# Patient Record
Sex: FEMALE | Race: BLACK OR AFRICAN AMERICAN
[De-identification: names, ages, dates, MRNs, and addresses within clinical notes are randomized per-mention and may not be internally consistent; named-entity substitution may affect disease eponyms.]

---

## 2017-04-07 ENCOUNTER — HOSPITAL ENCOUNTER (EMERGENCY)
Dept: HOSPITAL 62 - ER | Age: 27
Discharge: HOME | End: 2017-04-07
Payer: COMMERCIAL

## 2017-04-07 VITALS — DIASTOLIC BLOOD PRESSURE: 81 MMHG | SYSTOLIC BLOOD PRESSURE: 112 MMHG

## 2017-04-07 DIAGNOSIS — R51: ICD-10-CM

## 2017-04-07 DIAGNOSIS — R11.2: Primary | ICD-10-CM

## 2017-04-07 DIAGNOSIS — Z87.891: ICD-10-CM

## 2017-04-07 DIAGNOSIS — M79.1: ICD-10-CM

## 2017-04-07 DIAGNOSIS — J45.909: ICD-10-CM

## 2017-04-07 PROCEDURE — 87880 STREP A ASSAY W/OPTIC: CPT

## 2017-04-07 PROCEDURE — 87804 INFLUENZA ASSAY W/OPTIC: CPT

## 2017-04-07 PROCEDURE — 99283 EMERGENCY DEPT VISIT LOW MDM: CPT

## 2017-04-07 PROCEDURE — 87070 CULTURE OTHR SPECIMN AEROBIC: CPT

## 2017-04-07 NOTE — ER DOCUMENT REPORT
HPI





- HPI


Patient complains to provider of: flu symptoms with vomiting


Pain Level: 4


Context: 


Patient is a 27-year-old female presents emergency Department complaining of 

headache, body aches with nausea and vomiting that started this morning.  

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 

work or at home.  States she did not receive a flu vaccine this year.  She also 

admits to ejected fevers, chills.  Abdominal pain described as a burning achy 

pain in her epigastric area.  He denies any history of GERD or heartburn





Last menstrual period March 2016, was on Depo-Provera but stopped.  Sexually 

active with a female partner only.





Past medical history significant for asthma and seasonal allergies


Denies any surgeries


Social history admits to occasional alcohol use.








REVIEW OF SYSTEMS:


CONSTITUTIONAL :  admits to fever,  chills, or sweats.  Denies recent illness.


EENT:     Denies nasal or sinus congestion or discharge.  Denies throat, tongue

, or mouth swelling or difficulty swallowing.


CARDIOVASCULAR:  Denies chest pain.  Denies palpitations or racing or irregular 

heart beat.  Denies ankle edema.


RESPIRATORY:  Denies cough, cold, or chest congestion.  Denies shortness of 

breath, difficulty breathing, or wheezing.


GASTROINTESTINAL:  see history of present illness Denies abdominal pain or 

distention.  Denies blood in vomitus, stools, or per rectum.  Denies black, 

tarry stools.  Denies constipation. 


GENITOURINARY:  Denies difficulty urinating, painful urination, burning, 

frequency, blood in urine, or discharge.


FEMALE  GENITOURINARY:  Denies vaginal bleeding, heavy or abnormal periods, 

irregular periods.  Denies vaginal discharge or odor. 


MUSCULOSKELETAL: Denies any muscle spasms, difficulty walking, extremity pain


SKIN:   Denies rash, lesions or sores.


HEMATOLOGIC :   Denies easy bruising or bleeding.


LYMPHATIC:  Denies swollen, enlarged glands.


NEUROLOGICAL:  Denies confusion or altered mental status.  Denies passing out 

or loss of consciousness.  Denies dizziness or lightheadedness.  Denies 

headache.  Denies weakness or paralysis or loss of use of either side.  Denies 

problems with gait or speech.  Denies sensory loss, numbness, or tingling.  

Denies seizures.


PSYCHIATRIC:  Denies anxiety or stress.  Denies depression, suicidal ideation, 

or homicidal ideation.





ALL OTHER SYSTEMS REVIEWED AND NEGATIVE.








Dictation was performed using Dragon voice recognition software 





- DERM


Skin Color: Normal





Past Medical History





- Social History


Smoking Status: Former Smoker


Chew tobacco use (# tins/day): No


Frequency of alcohol use: Occasional


Drug Abuse: None


Family History: Reviewed & Not Pertinent


Patient has suicidal ideation: No


Patient has homicidal ideation: No


Pulmonary Medical History: Reports: Hx Asthma


Renal/ Medical History: Denies: Hx Peritoneal Dialysis


Surgical Hx: Negative





Vertical Provider Document





- CONSTITUTIONAL


Notes: 


PHYSICAL EXAM


GENERAL: Alert, interacts well. 


HEAD: Normocephalic, atraumatic.


EYES: Pupils equal, round, and reactive to light. Extraocular movements intact.


ENT: Peritonsillar erythema without any evidence of peritonsillar abscess or 

retropharyngeal abscess Oral mucosa moist, tongue midline. 


NECK: Full range of motion. Supple. Trachea midline.


LUNGS: Clear to auscultation bilaterally, no wheezes, rales, or rhonchi. No 

respiratory distress.


HEART: Regular rate and rhythm. No murmurs, gallops, or rubs.


ABDOMEN: Soft,  nondistended, nontender. No guarding, rebound, or rigidity.. 

Bowel sounds present in all 4 quadrants.


EXTREMITIES: Moves all 4 extremities spontaneously. No edema, radial and 

dorsalis pedis pulses 2/4 bilaterally. No cyanosis. 


NEUROLOGICAL: Alert and oriented x4. Normal speech.


PSYCH: Normal affect, normal mood.


SKIN: Warm, dry, normal turgor. No rashes or lesions noted.





- INFECTION CONTROL


TRAVEL OUTSIDE OF THE [place] IN LAST 30 DAYS: No





- RESPIRATORY


O2 Sat by Pulse Oximetry: 98





Course





- Re-evaluation


Re-evalutation: 





04/07/17 15:18


Patient is a 27-year-old female who presents with body aches and nausea 

vomiting that started today.  Patient responded well to GI cocktail.  This is 

negative for strep and influenza.  Hematologic is stable, no acute distress and 

afebrile.  Stable for discharge home.





- Vital Signs


Vital signs: 


 











Temp Pulse Resp BP Pulse Ox


 


 99.3 F   102 H  20   132/82 H  98 


 


 04/07/17 14:00  04/07/17 14:00  04/07/17 14:00  04/07/17 14:00  04/07/17 14:00














Discharge





- Discharge


Clinical Impression: 


 Nausea & vomiting





Condition: Good


Disposition: HOME, SELF-CARE


Instructions:  Acetaminophen, Antinausea Medication ([facility]), Viral Syndrome ([facility])

, Vomiting ([facility])


Prescriptions: 


Famotidine [Pepcid 20 mg Tablet] 20 mg PO BID #12 tablet


Ondansetron [Zofran Odt 4 mg Tablet] 1 - 2 tab PO Q4H PRN #15 tab.rapdis


 PRN Reason: For Nausea/Vomiting


Sucralfate [Carafate 1 gm Tablet] 1 gm PO ACHS #120 tablet


Forms:  Elevated Blood Pressure


Referrals: 


GEN LEVI MD [ACTIVE STAFF] - Follow up as needed

## 2017-04-09 ENCOUNTER — HOSPITAL ENCOUNTER (EMERGENCY)
Dept: HOSPITAL 62 - ER | Age: 27
Discharge: HOME | End: 2017-04-09
Payer: COMMERCIAL

## 2017-04-09 VITALS — DIASTOLIC BLOOD PRESSURE: 87 MMHG | SYSTOLIC BLOOD PRESSURE: 123 MMHG

## 2017-04-09 DIAGNOSIS — J45.909: ICD-10-CM

## 2017-04-09 DIAGNOSIS — R06.02: ICD-10-CM

## 2017-04-09 DIAGNOSIS — J34.89: ICD-10-CM

## 2017-04-09 DIAGNOSIS — R05: ICD-10-CM

## 2017-04-09 DIAGNOSIS — J02.9: ICD-10-CM

## 2017-04-09 DIAGNOSIS — J06.9: Primary | ICD-10-CM

## 2017-04-09 PROCEDURE — 99282 EMERGENCY DEPT VISIT SF MDM: CPT

## 2017-04-09 NOTE — ER DOCUMENT REPORT
HPI





- HPI


Patient complains to provider of: congestion


Pain Level: 5


Context: 


26 yo female c/o head congestion, sore throat, shortness of breath x 3 days.  P 

twas seen in ED 2 days ago, had negative rapid strep and rapid flu.  labs 

unremarkable.  pt reports n/v have improved but current symptoms started the 

day after her ED visis.  Denies fever, body aches, n/v.  Pt admits to hx/o 

asthma and her present symptoms are triggering her asthma.  Uses a rescue 

inhaler, which is about to "run out"


Associated Symptoms: Nonproductive cough, Sinus pain/drainage, Shortness of 

breath, Sore throat.  denies: Fever, Nausea, Vomiting


Exacerbated by: Denies


Relieved by: Denies


Similar symptoms previously: Yes


Recently seen / treated by doctor: Yes - ED 2 days ago





- ROS


Systems Reviewed and Negative: Yes All other systems reviewed and negative





- DERM


Skin Color: Normal





Past Medical History





- General


Information source: Patient





- Social History


Smoking Status: Never Smoker


Frequency of alcohol use: None


Drug Abuse: None


Lives with: Family


Family History: Reviewed & Not Pertinent


Patient has suicidal ideation: No


Patient has homicidal ideation: No


Pulmonary Medical History: Reports: Hx Asthma


Renal/ Medical History: Denies: Hx Peritoneal Dialysis





Vertical Provider Document





- CONSTITUTIONAL


Agree With Documented VS: Yes


Exam Limitations: No Limitations





- INFECTION CONTROL


TRAVEL OUTSIDE OF THE [place] IN LAST 30 DAYS: No





- HEENT


HEENT: Atraumatic, PERRLA, Pharyngeal Erythema.  negative: Pharyngeal Exudate





- NECK


Neck: Normal Inspection, Supple





- RESPIRATORY


Respiratory: Breath Sounds Normal, No Respiratory Distress


O2 Sat by Pulse Oximetry: 98





- CARDIOVASCULAR


Cardiovascular: Regular Rate, Regular Rhythm





- GI/ABDOMEN


Gastrointestinal: Abdomen Soft, Abdomen Non-Tender





- MUSCULOSKELETAL/EXTREMETIES


Musculoskeletal/Extremeties: MAEW





- NEURO


Level of Consciousness: Awake, Alert, Appropriate





- DERM


Integumentary: Warm, No Rash





Course





- Re-evaluation


Re-evalutation: 





04/09/17 09:19


H&P c/w URI.  recommend OTC antihistamine/decongestant.  Will refill albuterol 

inhaler for asthma.  Pt is afebrile, NAD.  pt agreeable with plan and stable 

for discharge





- Vital Signs


Vital signs: 


 











Temp Pulse Resp BP Pulse Ox


 


 98.3 F   83   16   123/87 H  98 


 


 04/09/17 08:52  04/09/17 08:52  04/09/17 08:52  04/09/17 08:52  04/09/17 08:52














Discharge





- Discharge


Clinical Impression: 


 URI (upper respiratory infection)





Condition: Stable


Disposition: HOME, SELF-CARE


Instructions:  Upper Respiratory Illness ([facility]), Asthma ([facility]), Inhaled 

Bronchodilators ([facility])


Additional Instructions: 


Your symptoms are consistant with a viral upper respiratory infection


no antibiotics are indicated


I recommend an over the counter antihistamine/decongestant such as Allegra D or 

Zyrtec D


Ibuprofin and push fluids


I will refill your inhaler for your asthma


Please follow up with your primary care if your symptoms persist


Prescriptions: 


Albuterol Sulfate [Proair HFA Inhalation Aerosol 8.5 gm MDI] 2 puff IH Q4H PRN #

1 mdi


 PRN Reason:

## 2017-05-12 ENCOUNTER — HOSPITAL ENCOUNTER (EMERGENCY)
Dept: HOSPITAL 62 - ER | Age: 27
Discharge: HOME | End: 2017-05-12
Payer: COMMERCIAL

## 2017-05-12 VITALS — DIASTOLIC BLOOD PRESSURE: 60 MMHG | SYSTOLIC BLOOD PRESSURE: 105 MMHG

## 2017-05-12 DIAGNOSIS — L29.9: ICD-10-CM

## 2017-05-12 DIAGNOSIS — R30.0: ICD-10-CM

## 2017-05-12 DIAGNOSIS — N89.8: ICD-10-CM

## 2017-05-12 DIAGNOSIS — N39.0: Primary | ICD-10-CM

## 2017-05-12 DIAGNOSIS — R35.0: ICD-10-CM

## 2017-05-12 DIAGNOSIS — J45.909: ICD-10-CM

## 2017-05-12 LAB
APPEARANCE UR: (no result)
BILIRUB UR QL STRIP: NEGATIVE
CHLAM PCR: NOT DETECTED
GLUCOSE UR STRIP-MCNC: NEGATIVE MG/DL
KETONES UR STRIP-MCNC: NEGATIVE MG/DL
NITRITE UR QL STRIP: POSITIVE
PH UR STRIP: 6 [PH] (ref 5–9)
PROT UR STRIP-MCNC: NEGATIVE MG/DL
SP GR UR STRIP: 1.02
UROBILINOGEN UR-MCNC: 2 MG/DL (ref ?–2)

## 2017-05-12 PROCEDURE — 87210 SMEAR WET MOUNT SALINE/INK: CPT

## 2017-05-12 PROCEDURE — 99283 EMERGENCY DEPT VISIT LOW MDM: CPT

## 2017-05-12 PROCEDURE — 81001 URINALYSIS AUTO W/SCOPE: CPT

## 2017-05-12 PROCEDURE — 87591 N.GONORRHOEAE DNA AMP PROB: CPT

## 2017-05-12 PROCEDURE — 87491 CHLMYD TRACH DNA AMP PROBE: CPT

## 2017-05-12 PROCEDURE — 81025 URINE PREGNANCY TEST: CPT

## 2017-05-12 NOTE — ER DOCUMENT REPORT
ED GI/





- General


Chief Complaint: Vaginal Itching


Stated Complaint: VAGINAL IRRITATION


Time Seen by Provider: 05/12/17 07:07


Mode of Arrival: Ambulatory


Information source: Patient


Notes: 


Patient presents complaining of vaginal redness, swelling and itching for the 

past month.  Patient reports using over-the-counter Monistat that improved her 

symptoms for a short time and then her symptoms returned.  Patient's concerned 

she may be yeast infection.  Patient also reports some dysuria and urinary 

frequency.  Patient denies any fever, abdominal pain, or back pain.


TRAVEL OUTSIDE OF THE [place] IN LAST 30 DAYS: No





- HPI


Patient complains to provider of: Other - Vaginal itching, redness.  No: Pelvic 

pain, Vaginal bleeding, Vaginal discharge


Onset: Other - One month


Timing/Duration: Waxing and waning


Quality of pain: Burning


Pain Level: 1


Vaginal bleeding (Compared to normal period): None


Sexual history: Active


Associated symptoms: Dysuria, Urinary frequency.  denies: Diarrhea, Fever, 

Nausea, Urinary hesitancy, Vomiting


Exacerbated by: Denies


Relieved by: Denies


Similar symptoms previously: Yes


Recently seen / treated by doctor: No





- Related Data


Allergies/Adverse Reactions: 


 





No Known Allergies Allergy (Verified 04/09/17 08:53)


 











Past Medical History





- General


Information source: Patient


Last Menstrual Period: March 2017





- Social History


Smoking Status: Never Smoker


Frequency of alcohol use: None


Drug Abuse: None


Family History: Reviewed & Not Pertinent


Patient has suicidal ideation: No


Patient has homicidal ideation: No


Pulmonary Medical History: Reports: Hx Asthma


Renal/ Medical History: Denies: Hx Peritoneal Dialysis


Surgical Hx: Negative





- Immunizations


Hx Diphtheria, Pertussis, Tetanus Vaccination: Yes





Review of Systems





- Review of Systems


Constitutional: No symptoms reported.  denies: Fever, Recent illness


EENT: No symptoms reported


Cardiovascular: No symptoms reported.  denies: Chest pain


Respiratory: No symptoms reported.  denies: Cough, Short of breath


Gastrointestinal: No symptoms reported.  denies: Abdominal pain, Diarrhea, 

Nausea, Vomiting


Genitourinary: Burning, Dysuria, Frequency


Female Genitourinary: Other - Vaginal redness, swelling.  denies: Vaginal 

discharge, Vaginal bleeding


Musculoskeletal: No symptoms reported.  denies: Back pain


Skin: No symptoms reported


Hematologic/Lymphatic: No symptoms reported


Neurological/Psychological: No symptoms reported





Physical Exam





- Vital signs


Vitals: 


 











Temp Pulse Resp BP Pulse Ox


 


 98.2 F   81   16   131/75 H  98 


 


 05/12/17 06:28  05/12/17 06:28  05/12/17 06:28  05/12/17 06:28  05/12/17 06:28














- General


General appearance: Appears well, Alert


In distress: None





- HEENT


Head: Normocephalic, Atraumatic


Eyes: Normal


Nasal: Normal


Mouth/Lips: Normal


Mucous membranes: Normal


Neck: Normal, Supple.  No: Lymphadenopathy





- Respiratory


Respiratory status: No respiratory distress


Chest status: Nontender


Breath sounds: Normal.  No: Rales, Rhonchi, Stridor, Wheezing


Chest palpation: Normal





- Cardiovascular


Rhythm: Regular


Heart sounds: S1 appreciated, S2 appreciated


Murmur: No





- Abdominal


Inspection: Normal


Distension: No distension


Bowel sounds: Normal


Tenderness: Nontender


Organomegaly: No organomegaly





- Genitourinary


External exam: Other - Mild erythema vaginal introitus.  No: Lesions


Speculum exam: Vaginal discharge - Small amount white clumpy discharge


Vaginal bleeding: None


Bimanuel exam: No: Cervical motion tender, Adnexal tenderness





- Back


Back: Normal, Nontender.  No: CVA tenderness





- Extremities


General upper extremity: Normal inspection, Normal strength


General lower extremity: Normal inspection, Normal strength





- Neurological


Neuro grossly intact: Yes


Cognition: Normal


Pérez Coma Scale Eye Opening: Spontaneous


Hazen Coma Scale Verbal: Oriented


Hazen Coma Scale Motor: Obeys Commands


Hazen Coma Scale Total: 15





- Psychological


Associated symptoms: Normal affect, Normal mood





- Skin


Skin Temperature: Warm


Skin Moisture: Dry


Skin Color: Normal





Course





- Vital Signs


Vital signs: 


 











Temp Pulse Resp BP Pulse Ox


 


 98.7 F   64   18   105/60   99 


 


 05/12/17 09:18  05/12/17 09:18  05/12/17 09:18  05/12/17 09:18  05/12/17 09:18














- Laboratory


Laboratory results interpreted by me: 


 











  05/12/17





  07:50


 


Urine Nitrite  POSITIVE H


 


Urine Urobilinogen  2.0 H


 


Ur Leukocyte Esterase  TRACE H











 Labs- Entire Visit











  05/12/17 05/12/17





  07:50 07:52


 


Urine Color  YELLOW 


 


Urine Appearance  SLIGHTLY-CLOUDY 


 


Urine pH  6.0 


 


Ur Specific Gravity  1.016 


 


Urine Protein  NEGATIVE 


 


Urine Glucose (UA)  NEGATIVE 


 


Urine Ketones  NEGATIVE 


 


Urine Blood  NEGATIVE 


 


Urine Nitrite  POSITIVE H 


 


Urine Bilirubin  NEGATIVE 


 


Urine Urobilinogen  2.0 H 


 


Ur Leukocyte Esterase  TRACE H 


 


Urine WBC (Auto)  4 


 


Urine RBC (Auto)  0 


 


Urine Bacteria (Auto)  TRACE 


 


Squamous Epi Cells Auto  1 


 


Urine Mucus (Auto)  FEW 


 


Urine Ascorbic Acid  NEGATIVE 


 


Urine HCG, Qual  NEGATIVE 


 


Trichomonas (Wet Prep)   NO TRICHOMONAS SEEN


 


Vaginal WBC   NO WBCS SEEN


 


Vaginal Yeast   NO YEAST SEEN














05/12/17 09:44








Discharge





- Discharge


Clinical Impression: 


UTI (urinary tract infection)


Qualifiers:


 Urinary tract infection type: site unspecified Hematuria presence: without 

hematuria Qualified Code(s): N39.0 - Urinary tract infection, site not specified





Condition: Stable


Disposition: HOME, SELF-CARE


Instructions:  Urinary Tract Infection ([facility]), Trimethoprim-Sulfa ([facility]), Urinary 

Anesthetic Agent ([facility])


Additional Instructions: 


Return immediately for any new or worsening symptoms





Followup with your primary care provider, call tomorrow to make a followup 

appointment








Prescriptions: 


Phenazopyridine HCl [Pyridium 200 mg Tablet] 200 mg PO TID #15 tablet


Sulfamethoxazole/Trimethoprim [Bactrim Ds Tablet] 1 each PO BID #10 tablet


Forms:  Return to Work


Referrals: 


ENEDINA MAHONEY [Primary Care Provider] - Follow up as needed


[facility] [Provider Group] - Follow up as needed


[facility] [Provider Group] - Follow up as needed

## 2019-08-06 ENCOUNTER — HOSPITAL ENCOUNTER (EMERGENCY)
Dept: HOSPITAL 62 - ER | Age: 29
Discharge: HOME | End: 2019-08-06
Payer: COMMERCIAL

## 2019-08-06 VITALS — DIASTOLIC BLOOD PRESSURE: 76 MMHG | SYSTOLIC BLOOD PRESSURE: 127 MMHG

## 2019-08-06 DIAGNOSIS — F17.200: ICD-10-CM

## 2019-08-06 DIAGNOSIS — M25.571: ICD-10-CM

## 2019-08-06 DIAGNOSIS — G89.29: Primary | ICD-10-CM

## 2019-08-06 DIAGNOSIS — J45.909: ICD-10-CM

## 2019-08-06 PROCEDURE — L4350 ANKLE CONTROL ORTHO PRE OTS: HCPCS

## 2019-08-06 PROCEDURE — 99283 EMERGENCY DEPT VISIT LOW MDM: CPT

## 2019-08-06 PROCEDURE — 96374 THER/PROPH/DIAG INJ IV PUSH: CPT

## 2019-08-06 NOTE — ER DOCUMENT REPORT
HPI





- HPI


Time Seen by Provider: 08/06/19 09:34


Pain Level: 4


Notes: 





Patient is a 29-year-old female with a history of asthma who presents 

complaining of right medial ankle pain intermittently for months.  Patient 

states that she has been seen by 2 podiatrists and has received insoles as well 

as an injection which do seem to help, but her symptoms return.  Patient states 

that she is on her feet 10 hours a day which is what precipitates her soreness. 

Patient states that the pain does not radiate.  No injury.  Denies drug 

allergies.  Denies any headache, fever, URI, sore throat, chest pain, 

palpitations, syncope, cough, shortness of breath, wheeze, dyspnea, abdominal 

pain, nausea/vomiting/diarrhea, urinary retention, dysuria, hematuria, back 

pain, loss of control of bowel or bladder, numbness/tingling, muscle 

paralysis/weakness, or rash.





- ROS


Systems Reviewed and Negative: Yes All other systems reviewed and negative





- REPRODUCTIVE


Reproductive: DENIES: Pregnant:





- MUSCULOSKELETAL


Musculoskeletal: REPORTS: Extremity pain - right ankle





Past Medical History





- Social History


Smoking Status: Current Every Day Smoker


Frequency of alcohol use: None


Drug Abuse: None


Family History: Reviewed & Not Pertinent


Patient has suicidal ideation: No


Patient has homicidal ideation: No


Pulmonary Medical History: Reports: Hx Asthma


Renal/ Medical History: Denies: Hx Peritoneal Dialysis





- Immunizations


Hx Diphtheria, Pertussis, Tetanus Vaccination: Yes





Vertical Provider Document





- CONSTITUTIONAL


Agree With Documented VS: Yes


Notes: 





PHYSICAL EXAMINATION:





GENERAL: Well-appearing, well-nourished and in no acute distress.





LUNGS: Breath sounds clear to auscultation bilaterally and equal.  No wheezes 

rales or rhonchi.





HEART: Regular rate and rhythm without murmurs, rubs, gallops.





Musculoskeletal: Rt foot/ankle:  No ecchymosis, swelling, erythema, warmth, or 

deformity.  FROM to passive/active. Strength 5+/5. N/V intact distal.  No bony 

tenderness of the foot/ankle.  Achilles intact.  Lis Franc maneuver neg.  

Anterior drawer neg.





Extremities:  No cyanosis, clubbing, or edema b/l.  Peripheral pulses 2+.  

Capillary refill less than 3 seconds.





NEUROLOGICAL: Normal speech, normal gait.  Normal sensory, motor exams 





PSYCH: Normal mood, normal affect.





SKIN: Warm, Dry, normal turgor, no rashes or lesions noted.





- INFECTION CONTROL


TRAVEL OUTSIDE OF THE [place] IN LAST 30 DAYS: No





Course





- Re-evaluation


Re-evalutation: 





08/06/19 09:43


Patient is an afebrile, well-hydrated, 29-year-old female who presents to the ED

with chronic right ankle pain.  Vitals are acceptable without any significant 

tachycardia, tachypnea, or hypoxia.  PE is otherwise unremarkable for any 

neurovascular compromise, obvious tendon/ligament rupture, obvious 

fracture/dislocation, septic joint.  Ankle stirrup and toradol given today.  

Patient is nontoxic-appearing.  Patient is able to ambulate and weight-bear.  No

other labs or imaging warranted at this time based on H&P.  Conservative 

measures otherwise for symptoms.  Recheck with your PCM in 3-5 days.  Consider 

consult podiatry.  Return to the ED with any worsening/concerning symptoms 

otherwise as reviewed in discharge.  Patient is in agreement.





- Vital Signs


Vital signs: 


                                        











Temp Pulse Resp BP Pulse Ox


 


 98.2 F   97   16   127/76 H  96 


 


 08/06/19 09:09  08/06/19 09:09  08/06/19 09:09  08/06/19 09:09  08/06/19 09:09














Discharge





- Discharge


Clinical Impression: 


Right ankle pain


Qualifiers:


 Chronicity: chronic Qualified Code(s): M25.571 - Pain in right ankle and joints

of right foot; G89.29 - Other chronic pain





Condition: Stable


Disposition: HOME, SELF-CARE


Additional Instructions: 


Rest, Ice, Compression, Elevation


Tylenol/ibuprofen as needed


Light stretches daily


Strength exercises as able


Moist heat and massage may help


F/u with your PCP in 3-5 days for a recheck


Consider consult(s) with Podiatry/Orthopedics/physical therapy for 

ongoing/worsening symptoms





Return to the ED with any worsening symptoms and/or development of fever, 

headache, chest pain, palpitations, syncope, shortness of breath, trouble 

breathing, abdominal pain, n/v/d, muscle weakness/paralysis, numbness/tingling, 

swelling, redness, or other worsening symptoms that are concerning to you.


Forms:  Elevated Blood Pressure, Smoking Cessation Education, Return to Work


Referrals: 


MONICA MCMANUS DPM [ACTIVE STAFF] - Follow up as needed

## 2019-11-04 ENCOUNTER — HOSPITAL ENCOUNTER (EMERGENCY)
Dept: HOSPITAL 62 - ER | Age: 29
Discharge: HOME | End: 2019-11-04
Payer: SELF-PAY

## 2019-11-04 VITALS — DIASTOLIC BLOOD PRESSURE: 65 MMHG | SYSTOLIC BLOOD PRESSURE: 105 MMHG

## 2019-11-04 DIAGNOSIS — R11.10: ICD-10-CM

## 2019-11-04 DIAGNOSIS — R50.9: ICD-10-CM

## 2019-11-04 DIAGNOSIS — F17.200: ICD-10-CM

## 2019-11-04 DIAGNOSIS — R19.7: Primary | ICD-10-CM

## 2019-11-04 DIAGNOSIS — R10.30: ICD-10-CM

## 2019-11-04 LAB
ADD MANUAL DIFF: NO
ADD MANUAL MICROSCOPIC: YES
ALBUMIN SERPL-MCNC: 3.4 G/DL (ref 3.5–5)
ALP SERPL-CCNC: 40 U/L (ref 38–126)
ANION GAP SERPL CALC-SCNC: 5 MMOL/L (ref 5–19)
APPEARANCE UR: (no result)
APTT PPP: YELLOW S
AST SERPL-CCNC: 17 U/L (ref 14–36)
BACTERIA #/AREA URNS HPF: (no result) /HPF
BASOPHILS # BLD AUTO: 0 10^3/UL (ref 0–0.2)
BASOPHILS NFR BLD AUTO: 0.4 % (ref 0–2)
BILIRUB DIRECT SERPL-MCNC: 0 MG/DL (ref 0–0.4)
BILIRUB SERPL-MCNC: 0.6 MG/DL (ref 0.2–1.3)
BILIRUB UR QL STRIP: NEGATIVE
BUN SERPL-MCNC: 8 MG/DL (ref 7–20)
CALCIUM: 8.7 MG/DL (ref 8.4–10.2)
CHLORIDE SERPL-SCNC: 109 MMOL/L (ref 98–107)
CO2 SERPL-SCNC: 24 MMOL/L (ref 22–30)
EOSINOPHIL # BLD AUTO: 0.1 10^3/UL (ref 0–0.6)
EOSINOPHIL NFR BLD AUTO: 1.4 % (ref 0–6)
ERYTHROCYTE [DISTWIDTH] IN BLOOD BY AUTOMATED COUNT: 11.9 % (ref 11.5–14)
GLUCOSE SERPL-MCNC: 92 MG/DL (ref 75–110)
GLUCOSE UR STRIP-MCNC: NEGATIVE MG/DL
HCT VFR BLD CALC: 42.2 % (ref 36–47)
HGB BLD-MCNC: 14.8 G/DL (ref 12–15.5)
KETONES UR STRIP-MCNC: NEGATIVE MG/DL
LYMPHOCYTES # BLD AUTO: 1.7 10^3/UL (ref 0.5–4.7)
LYMPHOCYTES NFR BLD AUTO: 34.7 % (ref 13–45)
MCH RBC QN AUTO: 33.2 PG (ref 27–33.4)
MCHC RBC AUTO-ENTMCNC: 35.1 G/DL (ref 32–36)
MCV RBC AUTO: 95 FL (ref 80–97)
MONOCYTES # BLD AUTO: 0.5 10^3/UL (ref 0.1–1.4)
MONOCYTES NFR BLD AUTO: 9.4 % (ref 3–13)
NEUTROPHILS # BLD AUTO: 2.7 10^3/UL (ref 1.7–8.2)
NEUTS SEG NFR BLD AUTO: 54.1 % (ref 42–78)
NITRITE UR QL STRIP: NEGATIVE
PH UR STRIP: 7 [PH] (ref 5–9)
PLATELET # BLD: 225 10^3/UL (ref 150–450)
POTASSIUM SERPL-SCNC: 4.4 MMOL/L (ref 3.6–5)
PROT SERPL-MCNC: 5.9 G/DL (ref 6.3–8.2)
PROT UR STRIP-MCNC: NEGATIVE MG/DL
RBC # BLD AUTO: 4.47 10^6/UL (ref 3.72–5.28)
RBC #/AREA URNS HPF: (no result) /HPF
SP GR UR STRIP: 1.02
TOTAL CELLS COUNTED % (AUTO): 100 %
UROBILINOGEN UR-MCNC: 2 MG/DL (ref ?–2)
WBC # BLD AUTO: 5 10^3/UL (ref 4–10.5)
WBC #/AREA URNS HPF: (no result) /HPF

## 2019-11-04 PROCEDURE — 36415 COLL VENOUS BLD VENIPUNCTURE: CPT

## 2019-11-04 PROCEDURE — 81001 URINALYSIS AUTO W/SCOPE: CPT

## 2019-11-04 PROCEDURE — 99284 EMERGENCY DEPT VISIT MOD MDM: CPT

## 2019-11-04 PROCEDURE — 80053 COMPREHEN METABOLIC PANEL: CPT

## 2019-11-04 PROCEDURE — S0119 ONDANSETRON 4 MG: HCPCS

## 2019-11-04 PROCEDURE — 83690 ASSAY OF LIPASE: CPT

## 2019-11-04 PROCEDURE — 85025 COMPLETE CBC W/AUTO DIFF WBC: CPT

## 2019-11-04 PROCEDURE — 81025 URINE PREGNANCY TEST: CPT

## 2019-11-04 NOTE — ER DOCUMENT REPORT
ED Medical Screen (RME)





- General


Chief Complaint: Abdominal Pain


Stated Complaint: ABDOMINAL PAIN


Time Seen by Provider: 11/04/19 07:58


Mode of Arrival: Ambulatory


Information source: Patient


Notes: 





Patient presents complaining of nausea vomiting diarrhea for the past several 

days with lower abdominal tenderness.  Patient denies any concerns about 

pregnancy.  Patient denies any urinary symptoms at this time.





I have greeted and performed a rapid initial assessment of this patient.  A 

comprehensive ED assessment and evaluation of the patient, analysis of test 

results and completion of the medical decision making process will be conducted 

by additional ED providers.


TRAVEL OUTSIDE OF THE [place] IN LAST 30 DAYS: No





- Related Data


Allergies/Adverse Reactions: 


                                        





No Known Allergies Allergy (Verified 11/04/19 07:32)


   








Home Medications: albuterol inhaler prn





Past Medical History





- Social History


Chew tobacco use (# tins/day): No


Frequency of alcohol use: None


Drug Abuse: None


Pulmonary Medical History: Reports: Hx Asthma


Renal/ Medical History: Denies: Hx Peritoneal Dialysis





- Immunizations


Hx Diphtheria, Pertussis, Tetanus Vaccination: Yes





Physical Exam





- Abdominal


Tenderness: Tender - Lower pelvic

## 2019-11-04 NOTE — ER DOCUMENT REPORT
ED General





- General


Chief Complaint: Abdominal Pain


Stated Complaint: ABDOMINAL PAIN


Time Seen by Provider: 11/04/19 07:58


Mode of Arrival: Ambulatory


Information source: Patient


TRAVEL OUTSIDE OF THE [place] IN LAST 30 DAYS: No





- HPI


Notes: 





29 healthy female presents today for ~2 days crampy intermittent abd pain 

worsened by loose stools throghout day, ++nausea w/o vomiting. reports comes 

today since started 2 days ago and just not getting better as of today. denies 

urinary symptoms. denies flank or inguinal or pelvic pain, gabbi VD, dysuria or

other urineary sx.  no known sick contacts. has felt slightly warm on and off 

hasnt' taken temp at home.  no travel. no other recent illness/antibiotics. 

denies h/o abd surgeries or GI hx.  has cont to have po intact but feels she 

fairly quickly has to have a loose BM which will make her cramp but then feels 

better once has defecated. denies melena/hematochezia/dyschezia. no (near) 

passing out. no rashes 





- Related Data


Allergies/Adverse Reactions: 


                                        





No Known Allergies Allergy (Verified 11/04/19 07:32)


   








Home Medications: albuterol inhaler prn





Past Medical History





- General


Information source: Patient





- Social History


Smoking Status: Current Every Day Smoker


Chew tobacco use (# tins/day): No


Frequency of alcohol use: None


Drug Abuse: None


Family History: Reviewed & Not Pertinent


Patient has suicidal ideation: No


Patient has homicidal ideation: No


Pulmonary Medical History: Reports: Hx Asthma


Renal/ Medical History: Denies: Hx Peritoneal Dialysis





- Immunizations


Hx Diphtheria, Pertussis, Tetanus Vaccination: Yes





Review of Systems





- Review of Systems


Constitutional: See HPI


EENT: No symptoms reported


Cardiovascular: No symptoms reported


Respiratory: No symptoms reported


Gastrointestinal: See HPI


Genitourinary: No symptoms reported


Female Genitourinary: No symptoms reported


Musculoskeletal: No symptoms reported


Skin: No symptoms reported


Hematologic/Lymphatic: No symptoms reported


Neurological/Psychological: No symptoms reported





Physical Exam





- Vital signs


Vitals: 


                                        











Temp Pulse Resp BP Pulse Ox


 


 98.0 F   88   14   120/74   100 


 


 11/04/19 07:33  11/04/19 07:33  11/04/19 07:33  11/04/19 07:33  11/04/19 07:33











Interpretation: Normal





- General


General appearance: Appears well, Alert


In distress: None





- HEENT


Head: Normocephalic, Atraumatic


Eyes: Normal


Pupils: PERRL





- Respiratory


Respiratory status: No respiratory distress


Chest status: Nontender


Breath sounds: Normal


Chest palpation: Normal





- Cardiovascular


Rhythm: Regular


Heart sounds: Normal auscultation


Murmur: No





- Abdominal


Inspection: Normal.  No: Wounds


Distension: No distension


Bowel sounds: Normal


Tenderness: Tender - there is nonfocal ttp she vergbalizes when i palpate more 

deeply in b/l mid/lower quadrants.  No: McBurney's point, Frost's sign, 

Guarding, Rebound


Organomegaly: No organomegaly





- Rectal


Notes: 





deferred had no episodes diarrhea while observed in ed





- Back


Back: Normal, Nontender





- Extremities


General upper extremity: Normal inspection, Nontender, Normal color, Normal ROM,

Normal temperature


General lower extremity: Normal inspection, Nontender, Normal color, Normal ROM,

Normal temperature, Normal weight bearing.  No: Juan's sign





- Neurological


Neuro grossly intact: Yes


Cognition: Normal


Orientation: AAOx4


Pérez Coma Scale Eye Opening: Spontaneous


[place] Coma Scale Verbal: Oriented


Pérez Coma Scale Motor: Obeys Commands


[place] Coma Scale Total: 15


Speech: Normal


Motor strength normal: LUE, RUE, LLE, RLE


Sensory: Normal





- Psychological


Associated symptoms: Normal affect, Normal mood





- Skin


Skin Temperature: Warm


Skin Moisture: Dry


Skin Color: Normal





Course





- Re-evaluation


Re-evalutation: 





12/04/19 18:12


pt labs reviewed wnl. upt (-) urine wnl. vomited nonbloody 1x inED after odt 

zofran therefore admin 4iv.  and then asked if she felt she could try some 

liquids. her pain wasn't a problem while she rested in ED and her VS remained 

wnl (at prior baselines); therefore didn't admin iv fluids. she tolerated po ok.







i spoke w/ pt regarding my exam which was reassuring to me since her ttp was 

mild and w/ her hx of low grade fevers and loose stools most likely this is a 

viral Ge. we discussed warning signs for return and ways to ensure she stays 

hydrated staying away from high sugar content liquids to prevent more diarrhea. 

pt understands. i also said watch out for bloody or cont diarrhea or if abd pain

were to become more localized eg. in center of belly or ruq or one specific spot

or she has increased sx w/ fever vomiting and this type of pain she should be 

seen and we'd need to consider her appendix and other things besides VGE. 


12/04/19 18:16








- Vital Signs


Vital signs: 


                                        











Temp Pulse Resp BP Pulse Ox


 


 99.1 F   83   18   105/65   96 


 


 11/04/19 11:07  11/04/19 11:07  11/04/19 11:07  11/04/19 11:07  11/04/19 11:07














- Laboratory


Result Diagrams: 


                                 11/04/19 08:36





                                 11/04/19 08:36


Laboratory results interpreted by me: 


                                        











  11/04/19 11/04/19





  08:00 08:36


 


Chloride   109 H


 


Total Protein   5.9 L


 


Albumin   3.4 L


 


Urine Urobilinogen  2.0 H 














Discharge





- Discharge


Clinical Impression: 


 Diarrhea, Abdominal cramping





Condition: Good


Disposition: HOME, SELF-CARE


Additional Instructions: 


Please follow-up with your regular doctor for regular maintenance.  Continue to 

stay hydrated and eat normal meals.  Watch for any fevers greater than 100.4, 

any vomiting or any blood in stool.  Please return if you do have any pain or 

vaginal discharge or symptoms, today you have not had any continued loose stools

and you are very well-appearing well-hydrated, so hopefully this is self 

resolved.  Please though be looking out for any worsening described above SO YOU

can seek care.


Forms:  Return to Work

## 2019-12-17 ENCOUNTER — HOSPITAL ENCOUNTER (EMERGENCY)
Dept: HOSPITAL 62 - ER | Age: 29
Discharge: LEFT BEFORE DISCHARGE | End: 2019-12-17
Payer: SELF-PAY

## 2019-12-17 VITALS — DIASTOLIC BLOOD PRESSURE: 79 MMHG | SYSTOLIC BLOOD PRESSURE: 105 MMHG

## 2019-12-17 DIAGNOSIS — Z53.21: Primary | ICD-10-CM

## 2020-04-14 ENCOUNTER — HOSPITAL ENCOUNTER (EMERGENCY)
Dept: HOSPITAL 62 - ER | Age: 30
Discharge: HOME | End: 2020-04-14
Payer: SELF-PAY

## 2020-04-14 VITALS — SYSTOLIC BLOOD PRESSURE: 123 MMHG | DIASTOLIC BLOOD PRESSURE: 88 MMHG

## 2020-04-14 DIAGNOSIS — J45.41: Primary | ICD-10-CM

## 2020-04-14 PROCEDURE — 99283 EMERGENCY DEPT VISIT LOW MDM: CPT

## 2020-04-14 PROCEDURE — 94640 AIRWAY INHALATION TREATMENT: CPT

## 2020-04-14 NOTE — ER DOCUMENT REPORT
HPI





<THADDEUS WALSH - Last Filed: 04/14/20 11:09>





- HPI


Notes: 





30-year-old female patient presented to the emergency department chief complaint

of possible asthma exacerbation.  Patient reports that she is out of her pro-air

inhaler.  She reports she has had increased wheezing over the last 4 days.  She 

states her allergies trigger her asthma.








- REPRODUCTIVE


Reproductive: DENIES: Pregnant:





<JUNIE LE - Last Filed: 04/16/20 01:40>





- HPI


Time Seen by Provider: 04/14/20 04:50





Past Medical History





- General


Information source: Patient





- Social History


Smoking Status: Never Smoker


Family History: Reviewed & Not Pertinent


Pulmonary Medical History: Reports: Hx Asthma


Renal/ Medical History: Denies: Hx Peritoneal Dialysis


Surgical Hx: Negative





- Immunizations


Hx Diphtheria, Pertussis, Tetanus Vaccination: Yes





<JUNIE LE - Last Filed: 04/16/20 01:40>





[facility] Provider Document





- CONSTITUTIONAL


Notes: 





PHYSICAL EXAMINATION:





GENERAL: Well-appearing, well-nourished and in no acute distress.





HEAD: Atraumatic, normocephalic.





EYES: Pupils equal round extraocular movements intact,  conjunctiva are normal.





ENT: Nares patent





NECK: Normal range of motion





LUNGS: No respiratory distress, mild to moderate expiratory wheezes noted 

bilaterally.  No increased work of breathing.





Musculoskeletal: Normal range of motion





NEUROLOGICAL:  Normal speech, normal gait. 





PSYCH: Normal mood, normal affect.





SKIN: Warm, Dry, normal turgor, no rashes or lesions noted.





- INFECTION CONTROL


TRAVEL OUTSIDE OF THE [place] IN LAST 30 DAYS: No





<JUNIE LE - Last Filed: 04/16/20 01:40>





Course





- Re-evaluation


Re-evalutation: 





04/14/20 10:47


[facility] pharmacy called and asked to have the nebulizer prescription sent to 

reload pharmacy.  This was done electronically.





- Vital Signs


Vital signs: 


                                        











Temp Pulse Resp BP Pulse Ox


 


 98.3 F   96   16   123/88 H  100 


 


 04/14/20 05:02  04/14/20 06:35  04/14/20 06:35  04/14/20 05:02  04/14/20 06:35














<THADDEUS WALSH - Last Filed: 04/14/20 11:09>





- Re-evaluation


Re-evalutation: 








Patient had mild to moderate expiratory wheezes noted on arrival.  She was not 

in any respiratory distress.  She was given breathing treatments and prednisone 

here in the emergency department and had significant relief.  Patient will be 

discharged home at this time.  Multiple prescriptions sent for patient.








<JUNIE LE ANALY - Last Filed: 04/16/20 01:40>





Discharge





<THADDEUS WALSH - Last Filed: 04/14/20 11:09>





<JUNIE LE - Last Filed: 04/16/20 01:40>





- Discharge


Clinical Impression: 


Asthma exacerbation


Qualifiers:


 Asthma severity: moderate Asthma persistence: persistent Qualified Code(s): 

J45.41 - Moderate persistent asthma with (acute) exacerbation





Condition: Stable


Disposition: HOME, SELF-CARE


Instructions:  Asthma ([facility])


Additional Instructions: 


Please take medications as prescribed.


Try to establish care with a primary care provider.


There is information on the second page of this packet for the [facility].


I write you a prescription for an inhaled corticosteroid.  If this is not 

affordable I also reviewed a prescription for a nebulizer with medicine for it 

that contain steroids.  Please use as prescribed.  Use the pro-air inhaler for 

shortness of breath or emergencies.  You may take 2 puffs every 4 hours as 

needed.





Prescriptions: 


Prednisone [Deltasone 20 mg Tablet] 3 tab PO DAILY 4 Days #12 tablet


Ipratropium/Albuterol Sulfate [Duoneb 3 ml Ampul] 3 ml NEB [id number] PRN #30 

vial.neb


 PRN Reason: 


Nebulizer [Intelligent Mesh Nebulizer] 1 each MC Q6 PRN #1 each


 PRN Reason: 


Nebulizer [Intelligent Mesh Nebulizer] 1 each MC Q6H PRN #1 each


 PRN Reason: 


Nebulizer [Intelligent Mesh Nebulizer] 1 each MC Q6H PRN #1 each


 PRN Reason: 


Beclomethasone Dipropionate [Qvar Redihaler] 1 - 4 puff IH BID #1 hfa.aeroba

## 2020-06-04 ENCOUNTER — HOSPITAL ENCOUNTER (EMERGENCY)
Dept: HOSPITAL 62 - ER | Age: 30
Discharge: HOME | End: 2020-06-04
Payer: SELF-PAY

## 2020-06-04 VITALS — DIASTOLIC BLOOD PRESSURE: 89 MMHG | SYSTOLIC BLOOD PRESSURE: 121 MMHG

## 2020-06-04 DIAGNOSIS — F17.200: ICD-10-CM

## 2020-06-04 DIAGNOSIS — R11.2: Primary | ICD-10-CM

## 2020-06-04 DIAGNOSIS — J45.909: ICD-10-CM

## 2020-06-04 DIAGNOSIS — Z20.828: ICD-10-CM

## 2020-06-04 DIAGNOSIS — R19.7: ICD-10-CM

## 2020-06-04 DIAGNOSIS — R06.02: ICD-10-CM

## 2020-06-04 LAB
A TYPE INFLUENZA AG: NEGATIVE
ADD MANUAL DIFF: NO
ALBUMIN SERPL-MCNC: 5 G/DL (ref 3.5–5)
ALP SERPL-CCNC: 79 U/L (ref 38–126)
ANION GAP SERPL CALC-SCNC: 7 MMOL/L (ref 5–19)
APPEARANCE UR: (no result)
APTT PPP: YELLOW S
AST SERPL-CCNC: 20 U/L (ref 14–36)
B INFLUENZA AG: NEGATIVE
BASOPHILS # BLD AUTO: 0 10^3/UL (ref 0–0.2)
BASOPHILS NFR BLD AUTO: 0.6 % (ref 0–2)
BILIRUB DIRECT SERPL-MCNC: 0 MG/DL (ref 0–0.4)
BILIRUB SERPL-MCNC: 1.1 MG/DL (ref 0.2–1.3)
BILIRUB UR QL STRIP: NEGATIVE
BUN SERPL-MCNC: 7 MG/DL (ref 7–20)
CALCIUM: 9.8 MG/DL (ref 8.4–10.2)
CHLORIDE SERPL-SCNC: 105 MMOL/L (ref 98–107)
CO2 SERPL-SCNC: 26 MMOL/L (ref 22–30)
EOSINOPHIL # BLD AUTO: 0.2 10^3/UL (ref 0–0.6)
EOSINOPHIL NFR BLD AUTO: 3 % (ref 0–6)
ERYTHROCYTE [DISTWIDTH] IN BLOOD BY AUTOMATED COUNT: 11.7 % (ref 11.5–14)
GLUCOSE SERPL-MCNC: 89 MG/DL (ref 75–110)
GLUCOSE UR STRIP-MCNC: NEGATIVE MG/DL
HCT VFR BLD CALC: 46.6 % (ref 36–47)
HGB BLD-MCNC: 16.2 G/DL (ref 12–15.5)
KETONES UR STRIP-MCNC: NEGATIVE MG/DL
LYMPHOCYTES # BLD AUTO: 2.5 10^3/UL (ref 0.5–4.7)
LYMPHOCYTES NFR BLD AUTO: 37.3 % (ref 13–45)
MCH RBC QN AUTO: 33.9 PG (ref 27–33.4)
MCHC RBC AUTO-ENTMCNC: 34.7 G/DL (ref 32–36)
MCV RBC AUTO: 98 FL (ref 80–97)
MONOCYTES # BLD AUTO: 0.4 10^3/UL (ref 0.1–1.4)
MONOCYTES NFR BLD AUTO: 6.6 % (ref 3–13)
NEUTROPHILS # BLD AUTO: 3.5 10^3/UL (ref 1.7–8.2)
NEUTS SEG NFR BLD AUTO: 52.5 % (ref 42–78)
NITRITE UR QL STRIP: NEGATIVE
PH UR STRIP: 6 [PH] (ref 5–9)
PLATELET # BLD: 259 10^3/UL (ref 150–450)
POTASSIUM SERPL-SCNC: 4.4 MMOL/L (ref 3.6–5)
PROT SERPL-MCNC: 8.5 G/DL (ref 6.3–8.2)
PROT UR STRIP-MCNC: NEGATIVE MG/DL
RBC # BLD AUTO: 4.76 10^6/UL (ref 3.72–5.28)
SP GR UR STRIP: 1.02
TOTAL CELLS COUNTED % (AUTO): 100 %
UROBILINOGEN UR-MCNC: 2 MG/DL (ref ?–2)
WBC # BLD AUTO: 6.6 10^3/UL (ref 4–10.5)

## 2020-06-04 PROCEDURE — 36415 COLL VENOUS BLD VENIPUNCTURE: CPT

## 2020-06-04 PROCEDURE — 87635 SARS-COV-2 COVID-19 AMP PRB: CPT

## 2020-06-04 PROCEDURE — 80053 COMPREHEN METABOLIC PANEL: CPT

## 2020-06-04 PROCEDURE — C9803 HOPD COVID-19 SPEC COLLECT: HCPCS

## 2020-06-04 PROCEDURE — 96361 HYDRATE IV INFUSION ADD-ON: CPT

## 2020-06-04 PROCEDURE — 84703 CHORIONIC GONADOTROPIN ASSAY: CPT

## 2020-06-04 PROCEDURE — 87804 INFLUENZA ASSAY W/OPTIC: CPT

## 2020-06-04 PROCEDURE — 96374 THER/PROPH/DIAG INJ IV PUSH: CPT

## 2020-06-04 PROCEDURE — 83690 ASSAY OF LIPASE: CPT

## 2020-06-04 PROCEDURE — 81001 URINALYSIS AUTO W/SCOPE: CPT

## 2020-06-04 PROCEDURE — 85025 COMPLETE CBC W/AUTO DIFF WBC: CPT

## 2020-06-04 PROCEDURE — 99284 EMERGENCY DEPT VISIT MOD MDM: CPT

## 2020-06-04 PROCEDURE — 71045 X-RAY EXAM CHEST 1 VIEW: CPT

## 2020-06-04 NOTE — ER DOCUMENT REPORT
ED GI/





- General


Chief Complaint: Nausea/Vomiting/Diarrhea


Stated Complaint: NAUSEA/VOMITING/DIARRHEA


Time Seen by Provider: 06/04/20 13:02


Primary Care Provider: 


[facility] [Provider Group] - Follow up as needed


Mode of Arrival: Ambulatory


Information source: Patient


Notes: 





Patient presents with a two-week history of nausea and 1 week history of 

diarrhea.  Patient states she has had diarrhea x3 episodes today.  Patient 

denies any vomiting.  Patient does state roommate has similar symptoms at home. 

Patient has had some shortness of breath with wheezing at home although denies 

any wheezing at this time.  Patient states she is almost out of her Nebules for 

her nebulizer machine.  Patient complains of mild cramping to the abdomen prior 

to diarrhea episodes but otherwise denies any abdominal tenderness.  Patient 

denies any fever.


TRAVEL OUTSIDE OF THE [place] IN LAST 30 DAYS: No





- HPI


Patient complains to provider of: Abdominal pain, Diarrhea, Other - Shortness of

breath


Onset: Last week


Timing/Duration: Persistent


Quality of pain: Cramping


Pain Level: 1


Location: Pelvis


Vaginal bleeding (Compared to normal period): None


Associated symptoms: Diarrhea, Nausea.  denies: Blood in stool, Urinary 

hesitancy, Urinary frequency, Urinary retention, Urinary urgency, Vomiting


Exacerbated by: Denies


Relieved by: Denies


Similar symptoms previously: No


Recently seen / treated by doctor: No





- Related Data


Allergies/Adverse Reactions: 


                                        





No Known Allergies Allergy (Verified 11/04/19 07:32)


   











Past Medical History





- General


Information source: Patient





- Social History


Smoking Status: Current Every Day Smoker


Frequency of alcohol use: Occasional


Drug Abuse: None


Occupation: Labor findings


Lives with: Friend


Family History: Reviewed & Not Pertinent


Pulmonary Medical History: Reports: Hx Asthma


Renal/ Medical History: Denies: Hx Peritoneal Dialysis


Surgical Hx: Negative





- Immunizations


Hx Diphtheria, Pertussis, Tetanus Vaccination: Yes





Review of Systems





- Review of Systems


Constitutional: No symptoms reported.  denies: Fever


EENT: No symptoms reported


Cardiovascular: No symptoms reported.  denies: Chest pain


Respiratory: Short of breath, Wheezing


Gastrointestinal: Abdominal pain, Diarrhea, Nausea.  denies: Vomiting, Blood 

streaked bowels


Genitourinary: No symptoms reported.  denies: Dysuria, Flank pain


Female Genitourinary: No symptoms reported


Musculoskeletal: No symptoms reported


Skin: No symptoms reported


Hematologic/Lymphatic: No symptoms reported


Neurological/Psychological: No symptoms reported





Physical Exam





- Vital signs


Vitals: 


                                        











Temp Pulse Resp BP Pulse Ox


 


 99.0 F   75   14   121/77   98 


 


 06/04/20 12:01  06/04/20 12:01  06/04/20 12:01  06/04/20 12:01  06/04/20 12:01














- General


General appearance: Appears well, Alert


In distress: None





- HEENT


Head: Normocephalic, Atraumatic


Eyes: Normal


Conjunctiva: Normal


Ears: Normal


External canal: Normal


Tympanic membrane: Normal


Nasal: Normal


Mouth/Lips: Normal


Mucous membranes: Normal


Pharynx: Normal.  No: Erythema, Exudate, Tonsillar hypertrophy


Neck: Normal, Supple.  No: Lymphadenopathy





- Respiratory


Respiratory status: No respiratory distress


Chest status: Nontender


Breath sounds: Normal.  No: Rales, Rhonchi, Stridor, Wheezing


Chest palpation: Normal





- Cardiovascular


Rhythm: Regular


Heart sounds: S1 appreciated, S2 appreciated





- Abdominal


Inspection: Normal


Distension: No distension


Bowel sounds: Normal


Tenderness: Nontender


Organomegaly: No organomegaly





- Back


Back: Normal, Nontender.  No: CVA tenderness





- Extremities


General upper extremity: Normal inspection, Nontender, Normal strength


General lower extremity: Normal inspection, Nontender, Normal strength





- Neurological


Neuro grossly intact: Yes


Cognition: Normal


[place] Coma Scale Eye Opening: Spontaneous


[place] Coma Scale Verbal: Oriented


[place] Coma Scale Motor: Obeys Commands


Pérez Coma Scale Total: 15





- Psychological


Associated symptoms: Normal affect, Normal mood





- Skin


Skin Temperature: Warm


Skin Moisture: Dry


Skin Color: Normal





Course





- Re-evaluation


Re-evalutation: 





06/04/20 16:57


Patient's abdomen soft nontender.  Patient denies any additional episodes of 

diarrhea.  Breath sounds clear bilaterally.  Patient denies any shortness of 

breath.  Chest x-ray reviewed, no concern for pneumonia or pneumothorax.  

Patient will be screened for the coronavirus at this time.  Patient reports that

she did have wheezing at home and does have an underlying history of asthma.  

Patient also reports that she is almost out of her Nebules for her nebulizer.  

Good return precautions discussed with patient.  Patient verbalized 

understanding is agreeable with discharge plan of care at this time.





- Vital Signs


Vital signs: 


                                        











Temp Pulse Resp BP Pulse Ox


 


 98.4 F   68   14   121/89 H  100 


 


 06/04/20 17:32  06/04/20 17:32  06/04/20 17:32  06/04/20 17:32  06/04/20 17:32














- Laboratory


Result Diagrams: 


                                 06/04/20 15:30





                                 06/04/20 15:30


Laboratory results interpreted by me: 


                                        











  06/04/20 06/04/20 06/04/20





  13:10 15:30 15:30


 


Hgb   16.2 H 


 


MCV   98 H 


 


MCH   33.9 H 


 


Total Protein    8.5 H


 


Urine Urobilinogen  2.0 H  











                              Labs- All tests 24 hr











  06/04/20 06/04/20 06/04/20





  13:10 14:30 15:30


 


WBC    6.6


 


RBC    4.76


 


Hgb    16.2 H


 


Hct    46.6


 


MCV    98 H


 


MCH    33.9 H


 


MCHC    34.7


 


RDW    11.7


 


Plt Count    259


 


Lymph % (Auto)    37.3


 


Mono % (Auto)    6.6


 


Eos % (Auto)    3.0


 


Baso % (Auto)    0.6


 


Absolute Neuts (auto)    3.5


 


Absolute Lymphs (auto)    2.5


 


Absolute Monos (auto)    0.4


 


Absolute Eos (auto)    0.2


 


Absolute Basos (auto)    0.0


 


Seg Neutrophils %    52.5


 


Sodium   


 


Potassium   


 


Chloride   


 


Carbon Dioxide   


 


Anion Gap   


 


BUN   


 


Creatinine   


 


Est GFR ( Amer)   


 


Est GFR (MDRD) Non-Af   


 


Glucose   


 


Calcium   


 


Total Bilirubin   


 


Direct Bilirubin   


 


Neonat Total Bilirubin   


 


Neonat Direct Bilirubin   


 


Neonat Indirect Bili   


 


AST   


 


ALT   


 


Alkaline Phosphatase   


 


Total Protein   


 


Albumin   


 


Lipase   


 


Serum HCG, Qual   


 


Urine Color  YELLOW  


 


Urine Appearance  SLIGHTLY-CLOUDY  


 


Urine pH  6.0  


 


Ur Specific Gravity  1.021  


 


Urine Protein  NEGATIVE  


 


Urine Glucose (UA)  NEGATIVE  


 


Urine Ketones  NEGATIVE  


 


Urine Blood  NEGATIVE  


 


Urine Nitrite  NEGATIVE  


 


Urine Bilirubin  NEGATIVE  


 


Urine Urobilinogen  2.0 H  


 


Ur Leukocyte Esterase  NEGATIVE  


 


Urine WBC (Auto)  1  


 


Urine RBC (Auto)  10  


 


Urine Bacteria (Auto)  1+  


 


Squamous Epi Cells Auto  <1  


 


Urine Mucus (Auto)  FEW  


 


Urine Ascorbic Acid  NEGATIVE  


 


Influenza A (Rapid)   NEGATIVE 


 


Influenza B (Rapid)   NEGATIVE 














  06/04/20 06/04/20





  15:30 15:30


 


WBC  


 


RBC  


 


Hgb  


 


Hct  


 


MCV  


 


MCH  


 


MCHC  


 


RDW  


 


Plt Count  


 


Lymph % (Auto)  


 


Mono % (Auto)  


 


Eos % (Auto)  


 


Baso % (Auto)  


 


Absolute Neuts (auto)  


 


Absolute Lymphs (auto)  


 


Absolute Monos (auto)  


 


Absolute Eos (auto)  


 


Absolute Basos (auto)  


 


Seg Neutrophils %  


 


Sodium  138.3 


 


Potassium  4.4 


 


Chloride  105 


 


Carbon Dioxide  26 


 


Anion Gap  7 


 


BUN  7 


 


Creatinine  0.65 


 


Est GFR ( Amer)  > 60 


 


Est GFR (MDRD) Non-Af  > 60 


 


Glucose  89 


 


Calcium  9.8 


 


Total Bilirubin  1.1 


 


Direct Bilirubin  0.0 


 


Neonat Total Bilirubin  Not Reportable 


 


Neonat Direct Bilirubin  Not Reportable 


 


Neonat Indirect Bili  Not Reportable 


 


AST  20 


 


ALT  12 


 


Alkaline Phosphatase  79 


 


Total Protein  8.5 H 


 


Albumin  5.0 


 


Lipase  101.9 


 


Serum HCG, Qual   NEGATIVE


 


Urine Color  


 


Urine Appearance  


 


Urine pH  


 


Ur Specific Gravity  


 


Urine Protein  


 


Urine Glucose (UA)  


 


Urine Ketones  


 


Urine Blood  


 


Urine Nitrite  


 


Urine Bilirubin  


 


Urine Urobilinogen  


 


Ur Leukocyte Esterase  


 


Urine WBC (Auto)  


 


Urine RBC (Auto)  


 


Urine Bacteria (Auto)  


 


Squamous Epi Cells Auto  


 


Urine Mucus (Auto)  


 


Urine Ascorbic Acid  


 


Influenza A (Rapid)  


 


Influenza B (Rapid)  














- Diagnostic Test


Radiology reviewed: Reports reviewed





Discharge





- Discharge


Clinical Impression: 


 Nausea, Shortness of breath





Diarrhea


Qualifiers:


 Diarrhea type: unspecified type Qualified Code(s): R19.7 - Diarrhea, 

unspecified





Asthma


Qualifiers:


 Asthma severity: unspecified severity Asthma persistence: unspecified Asthma 

complication type: unspecified Qualified Code(s): J45.909 - Unspecified asthma, 

uncomplicated





Condition: Stable


Disposition: HOME, SELF-CARE


Instructions:  COVID-19 Guidance for Persons Under Investigation, Antinausea 

Medication ([facility]), Asthma ([facility]), Diarrhea, Nonspecific ([facility]), Inhaled 

Bronchodilators ([facility]), Intravenous (IV) Fluids ([facility]), Steroid Medication


Additional Instructions: 


Return immediately for any new or worsening symptoms





Followup with your primary care provider, call tomorrow to make a followup 

appointment








Prescriptions: 


Prednisone [Deltasone 10 mg Tablet] 10 mg PO ASDIR #21 tablet


Albuterol Sulfate [Ventolin 0.083% Neb 2.5 mg/3 ml Ampul] 1 vial NEB Q4 PRN #30 

vial


 PRN Reason: 


Ondansetron [Zofran Odt 4 mg Tablet] 1 tab PO Q6H #15 tab.rapdis


Forms:  Return to Work


Referrals: 


[facility] [Provider Group] - Follow up as needed

## 2020-06-04 NOTE — RADIOLOGY REPORT (SQ)
EXAM DESCRIPTION:  CHEST SINGLE VIEW



IMAGES COMPLETED DATE/TIME:  6/4/2020 1:51 pm



REASON FOR STUDY:  sob



COMPARISON:  None.



NUMBER OF VIEWS:  One view.



TECHNIQUE:  Single frontal radiographic view of the chest acquired.



LIMITATIONS:  None.



FINDINGS:  LUNGS AND PLEURA: No opacities, masses or pneumothorax. No pleural effusion.

MEDIASTINUM AND HILAR STRUCTURES: No masses.  Contour normal.

HEART AND VASCULAR STRUCTURES: Heart normal in size.  Normal vasculature.

BONES: No acute findings.

HARDWARE: None in the chest.

OTHER: No other significant finding.



IMPRESSION:  NO SIGNIFICANT RADIOGRAPHIC FINDING IN THE CHEST.



TECHNICAL DOCUMENTATION:  JOB ID:  [id number]

 2011 [employer]- All Rights Reserved



Reading location - IP/workstation name: [email or web address]

## 2020-08-11 ENCOUNTER — HOSPITAL ENCOUNTER (EMERGENCY)
Dept: HOSPITAL 62 - ER | Age: 30
Discharge: HOME | End: 2020-08-11
Payer: SELF-PAY

## 2020-08-11 VITALS — SYSTOLIC BLOOD PRESSURE: 106 MMHG | DIASTOLIC BLOOD PRESSURE: 71 MMHG

## 2020-08-11 DIAGNOSIS — R41.0: ICD-10-CM

## 2020-08-11 DIAGNOSIS — R42: ICD-10-CM

## 2020-08-11 DIAGNOSIS — R55: Primary | ICD-10-CM

## 2020-08-11 LAB
ADD MANUAL DIFF: NO
ALBUMIN SERPL-MCNC: 4.3 G/DL (ref 3.5–5)
ALP SERPL-CCNC: 63 U/L (ref 38–126)
ANION GAP SERPL CALC-SCNC: 5 MMOL/L (ref 5–19)
APPEARANCE UR: (no result)
APTT PPP: YELLOW S
AST SERPL-CCNC: 19 U/L (ref 14–36)
BASOPHILS # BLD AUTO: 0 10^3/UL (ref 0–0.2)
BASOPHILS NFR BLD AUTO: 0.6 % (ref 0–2)
BILIRUB DIRECT SERPL-MCNC: 0 MG/DL (ref 0–0.4)
BILIRUB SERPL-MCNC: 0.9 MG/DL (ref 0.2–1.3)
BILIRUB UR QL STRIP: NEGATIVE
BUN SERPL-MCNC: 13 MG/DL (ref 7–20)
CALCIUM: 9 MG/DL (ref 8.4–10.2)
CHLORIDE SERPL-SCNC: 110 MMOL/L (ref 98–107)
CK MB SERPL-MCNC: 0.24 NG/ML (ref ?–4.55)
CK SERPL-CCNC: 76 U/L (ref 30–135)
CO2 SERPL-SCNC: 22 MMOL/L (ref 22–30)
EOSINOPHIL # BLD AUTO: 0.1 10^3/UL (ref 0–0.6)
EOSINOPHIL NFR BLD AUTO: 1.4 % (ref 0–6)
ERYTHROCYTE [DISTWIDTH] IN BLOOD BY AUTOMATED COUNT: 11.9 % (ref 11.5–14)
GLUCOSE SERPL-MCNC: 124 MG/DL (ref 75–110)
GLUCOSE UR STRIP-MCNC: NEGATIVE MG/DL
HCT VFR BLD CALC: 42 % (ref 36–47)
HGB BLD-MCNC: 14.6 G/DL (ref 12–15.5)
KETONES UR STRIP-MCNC: NEGATIVE MG/DL
LYMPHOCYTES # BLD AUTO: 2.2 10^3/UL (ref 0.5–4.7)
LYMPHOCYTES NFR BLD AUTO: 27.9 % (ref 13–45)
MCH RBC QN AUTO: 33.7 PG (ref 27–33.4)
MCHC RBC AUTO-ENTMCNC: 34.8 G/DL (ref 32–36)
MCV RBC AUTO: 97 FL (ref 80–97)
MONOCYTES # BLD AUTO: 0.5 10^3/UL (ref 0.1–1.4)
MONOCYTES NFR BLD AUTO: 6.4 % (ref 3–13)
NEUTROPHILS # BLD AUTO: 5 10^3/UL (ref 1.7–8.2)
NEUTS SEG NFR BLD AUTO: 63.7 % (ref 42–78)
NITRITE UR QL STRIP: NEGATIVE
PH UR STRIP: 6 [PH] (ref 5–9)
PLATELET # BLD: 235 10^3/UL (ref 150–450)
POTASSIUM SERPL-SCNC: 3.8 MMOL/L (ref 3.6–5)
PROT SERPL-MCNC: 7.6 G/DL (ref 6.3–8.2)
PROT UR STRIP-MCNC: NEGATIVE MG/DL
RBC # BLD AUTO: 4.34 10^6/UL (ref 3.72–5.28)
SP GR UR STRIP: 1.03
TOTAL CELLS COUNTED % (AUTO): 100 %
TROPONIN I SERPL-MCNC: < 0.012 NG/ML
UROBILINOGEN UR-MCNC: 2 MG/DL (ref ?–2)
WBC # BLD AUTO: 7.8 10^3/UL (ref 4–10.5)

## 2020-08-11 PROCEDURE — 82553 CREATINE MB FRACTION: CPT

## 2020-08-11 PROCEDURE — 85025 COMPLETE CBC W/AUTO DIFF WBC: CPT

## 2020-08-11 PROCEDURE — 36415 COLL VENOUS BLD VENIPUNCTURE: CPT

## 2020-08-11 PROCEDURE — 84484 ASSAY OF TROPONIN QUANT: CPT

## 2020-08-11 PROCEDURE — 82550 ASSAY OF CK (CPK): CPT

## 2020-08-11 PROCEDURE — 81001 URINALYSIS AUTO W/SCOPE: CPT

## 2020-08-11 PROCEDURE — 84703 CHORIONIC GONADOTROPIN ASSAY: CPT

## 2020-08-11 PROCEDURE — 93010 ELECTROCARDIOGRAM REPORT: CPT

## 2020-08-11 PROCEDURE — 93005 ELECTROCARDIOGRAM TRACING: CPT

## 2020-08-11 PROCEDURE — 71046 X-RAY EXAM CHEST 2 VIEWS: CPT

## 2020-08-11 PROCEDURE — 70450 CT HEAD/BRAIN W/O DYE: CPT

## 2020-08-11 PROCEDURE — 99285 EMERGENCY DEPT VISIT HI MDM: CPT

## 2020-08-11 PROCEDURE — 80053 COMPREHEN METABOLIC PANEL: CPT

## 2020-08-11 NOTE — ER DOCUMENT REPORT
ED Medical Screen (RME)





- General


Chief Complaint: Syncope


Stated Complaint: POSSIBLE SYNCOPE


Time Seen by Provider: 08/11/20 10:10


TRAVEL OUTSIDE OF THE [place] IN LAST 30 DAYS: No





- HPI


Notes: 





08/11/20 10:32


30-year-old female presents to the emergency room for reports of having 

intermittent syncopal events since 2014, however 2 nights ago she states that 

she blacked out while she was in the shower.  Reports that she started to feel 

dizzy and tried to get out of the shower before she knew it she blacked out.  

States that she woke up with her feet over the tub.  Unwitnessed event.  Denies 

any new foods medications or supplements.  Patient states that she has had dizzy

spells in the past with syncopal events but this time it scared her the most 

being in the shower and passing out.  Patient is never been seen by a primary 

care provider neurologist or cardiologist for this concerned because it never 

scared her before.  States her last menstrual cycle was July 13, 2020.  Patient 

states she has not noticed a pattern with her syncopal events, denies having an 

aura.  Denies any history of cardiac issues, seizures, any other medical issues 

besides having asthma.  Denies any chest pain or shortness of breath, denies any

fevers or chills.  I have greeted and performed a rapid initial assessment of 

this patient.  A comprehensive ED assessment and evaluation of the patient, 

analysis of test results and completion of the medical decision making process 

will be conducted by additional ED providers.





PHYSICAL EXAMINATION:





GENERAL: Well-appearing, well-nourished and in no acute distress.





HEAD: Atraumatic, normocephalic.





EYES: Pupils equal round extraocular movements intact,  conjunctiva are normal.





NECK: Normal range of motion





CV: s1, s2 regular 





LUNGS: No respiratory distress





Musculoskeletal: Normal range of motion





NEUROLOGICAL:  Normal speech, normal gait. 





SKIN: Warm, Dry, normal turgor, no rashes or lesions noted.











- Related Data


Allergies/Adverse Reactions: 


                                        





No Known Allergies Allergy (Verified 11/04/19 07:32)


   











Past Medical History





- Social History


Frequency of alcohol use: Occasional


Pulmonary Medical History: Reports: Hx Asthma


Renal/ Medical History: Denies: Hx Peritoneal Dialysis





- Immunizations


Hx Diphtheria, Pertussis, Tetanus Vaccination: Yes





Physical Exam





- Vital signs


Vitals: 





                                        











Temp Pulse Resp BP Pulse Ox


 


 98.6 F   82   16   127/83 H  98 


 


 08/11/20 08:22  08/11/20 08:22  08/11/20 08:22  08/11/20 08:22  08/11/20 08:22














Course





- Vital Signs


Vital signs: 





                                        











Temp Pulse Resp BP Pulse Ox


 


 98.6 F   68   16   108/74   98 


 


 08/11/20 08:22  08/11/20 09:02  08/11/20 08:22  08/11/20 09:02  08/11/20 08:22














- Laboratory


Result Diagrams: 


                                 08/11/20 08:40





                                 08/11/20 08:40


Laboratory results interpreted by me: 





                                        











  08/11/20 08/11/20 08/11/20





  08:40 08:40 08:40


 


MCH  33.7 H  


 


Chloride   110 H 


 


Glucose   124 H 


 


Urine Blood    SMALL H


 


Urine Urobilinogen    2.0 H

## 2020-08-11 NOTE — ER DOCUMENT REPORT
ED Syncope and Near Syncope





- General


Chief Complaint: Syncope


Stated Complaint: POSSIBLE SYNCOPE


Time Seen by Provider: 08/11/20 10:10


Primary Care Provider: 


[facility] [Provider Group] - Follow up in 3-5 days


[facility] [Provider Group] - Follow up in 3-5 days


[facility] [Provider Group] - Follow up in 3-5 days


JOSEP FRITZ MD [ACTIVE STAFF] - Follow up in 3-5 days


LOVE PANTOJA MD [NO LOCAL MD] - Follow up in 3-5 days


Notes: 





Patient is a 30-year-old female with no past medical history who presents to the

emergency department with a chief complaint of a syncopal episode.  Patient sta

joselyn that she was in the bathroom last night and she does not remember passing 

out, but she did and when she woke up she was disoriented.  Patient called 

telemedicine and was referred here to the emergency department.  Denies any 

shortness of breath, difficulty breathing, chest pain, abdominal pain, 

shakiness, or any other symptoms.


TRAVEL OUTSIDE OF THE [place] IN LAST 30 DAYS: No





- Related Data


Allergies/Adverse Reactions: 


                                        





No Known Allergies Allergy (Verified 11/04/19 07:32)


   











Past Medical History





- Social History


Smoking Status: Never Smoker


Frequency of alcohol use: Occasional


Family History: Reviewed & Not Pertinent


Pulmonary Medical History: Reports: Hx Asthma


Renal/ Medical History: Denies: Hx Peritoneal Dialysis





- Immunizations


Hx Diphtheria, Pertussis, Tetanus Vaccination: Yes





Review of Systems





- Review of Systems


Notes: 





REVIEW OF SYSTEMS:





CONSTITUTIONAL :    Denies recent illness.  Denies recent unintentional weight 

loss.  Denies fever,  chills, or sweats. 


EENT: Denies eye, ear, throat, or mouth pain, discharge, or symptoms.  Denies 

nasal or sinus congestion.


CARDIOVASCULAR:  Denies chest pain.


RESPIRATORY: Denies shortness of breath, cough, congestion, difficulty 

breathing, or wheezing. 


GASTROINTESTINAL: Denies nausea, vomiting, and diarrhea.  Denies abdominal pain.

 Denies constipation. 


GENITOURINARY:  Denies difficulty urinating, burning, blood in urine, urgency or

frequency.


MUSCULOSKELETAL:  Denies neck and back pain.  Denies joint pain or swelling.


SKIN:   Denies rash, itchiness, or lesions


HEMATOLOGIC :   Denies easy bruising or bleeding.


LYMPHATIC:  Denies swollen, painful, enlarged glands.


NEUROLOGICAL: See HPI.


PSYCHIATRIC:  Denies stress, anxiety, alteration in sleep patterns, or 

depression.





All other systems reviewed and negative.





Physical Exam





- Vital signs


Vitals: 


                                        











Temp Pulse Resp BP Pulse Ox


 


 98.6 F   82   16   127/83 H  98 


 


 08/11/20 08:22  08/11/20 08:22  08/11/20 08:22  08/11/20 08:22  08/11/20 08:22














- Notes


Notes: 





PHYSICAL EXAMINATION:





GENERAL: Appears well, healthy, well-nourished, no acute distress. 





HEAD:  Normocephalic, atraumatic.





EYES:  PERRL, conjunctiva normal, all extraocular movements intact, sclera 

nonicteric





ENT:  Moist mucous membranes. 





NECK: Supple, no noticeable swelling, redness, rash.  Normal range of motion.





LUNGS: Equal breath sounds bilaterally and clear to auscultation.  No wheezes 

rales or rhonchi.





CARDIOVASCULAR: S1-S2, regular rate, regular rhythm.  Radial pulses 2+, normal.





ABDOMEN: Normoactive bowel sounds.  Soft, nontender,  no guarding, no rebound 

tenderness, and no masses palpated.





EXTREMITIES: Normal strength and range of motion, no pitting or edema.  No 

cyanosis. 





NEUROLOGICAL: Moves all extremities upon command.  Strength 5/5 in all 

extremities. 





PSYCH: Normal mood, normal affect.





SKIN: Warm, dry.  No rash, lesions, ulcerations noted.  Normal skin turgor.





Course





- Re-evaluation


Re-evalutation: 





08/11/20 12:45


I attempted to call Dr. Caceres, the doctor who took care of the patient via 

telemedicine.  Will await callback. Hematology is unremarkable.  Chemistries are

also unremarkable, other than a slightly elevated chloride.  LFTs are normal.  

Renal function is also normal.  Troponin is negative.  hCG is negative.  I 

urinalysis shows a small amount of blood, but is otherwise unremarkable.  

Patient is possibly starting her menstrual cycle.





08/11/20 12:55


I spoke with Dr. Caceres and he is not the patient's primary care doctor.  The 

patient paid out of pocket for telemedicine.  I recommended the patient follow-

up with neurology and with cardiology.  She agrees with this plan.  I told her 

that these visits may be expensive and will put a discharge planning order in.  

She is in agreement with this plan.  Follow-up precautions were given.  Verbal 

discharge instructions were given to the patient.  They verbalized 

understanding.  They are stable for discharge.








- Vital Signs


Vital signs: 


                                        











Temp Pulse Resp BP Pulse Ox


 


 98 F   68   11 L  106/71   99 


 


 08/11/20 13:54  08/11/20 09:02  08/11/20 13:54  08/11/20 13:54  08/11/20 13:54














- Laboratory


Result Diagrams: 


                                 08/11/20 08:40





                                 08/11/20 08:40


Laboratory results interpreted by me: 


                                        











  08/11/20 08/11/20 08/11/20





  08:40 08:40 08:40


 


MCH  33.7 H  


 


Chloride   110 H 


 


Glucose   124 H 


 


Urine Blood    SMALL H


 


Urine Urobilinogen    2.0 H














- EKG Interpretation by Me


Additional EKG results interpreted by me: 





08/11/20 13:01


Sinus rhythm with first-degree heart block with a rate of 71.  ; QRS 88; 

; QTc 413.  No ST elevations or depressions noted.  No previous EKG for 

comparison.





Discharge





- Discharge


Clinical Impression: 


Syncope


Qualifiers:


 Syncope type: unspecified Qualified Code(s): R55 - Syncope and collapse





Condition: Stable


Disposition: HOME, SELF-CARE


Additional Instructions: 


You were seen today in the emergency department for syncopal episode.  Your labs

are very reassuring.  I recommend that you follow-up with neurology since you 

have had multiple syncopal episodes.  Also follow-up with cardiology.  When you 

get insurance, I recommend that you get a primary care provider.  You can 

follow-up with [facility] or [facility], which are 

clinics in the area to help people without insurance.


Referrals: 


JOSEP FRITZ MD [ACTIVE STAFF] - Follow up in 3-5 days


[facility] [Provider Group] - Follow up in 3-5 days


[facility] [Provider Group] - Follow up in 3-5 days


[facility] [Provider Group] - Follow up in 3-5 days


LOVE PANTOJA MD [NO LOCAL MD] - Follow up in 3-5 days

## 2020-08-11 NOTE — RADIOLOGY REPORT (SQ)
EXAM DESCRIPTION:  CT HEAD WITHOUT



IMAGES COMPLETED DATE/TIME:  8/11/2020 12:06 pm



REASON FOR STUDY:  Unwitnessed syncopal event last night in shower



COMPARISON:  None.



TECHNIQUE:  Axial images acquired through the brain without intravenous contrast.  Images reviewed wi
th bone, brain and subdural windows.   Images stored on PACS.

All CT scanners at this facility use dose modulation, iterative reconstruction, and/or weight based d
osing when appropriate to reduce radiation dose to as low as reasonably achievable (ALARA).

[facility]: Dose Right  [facility]: CareDose    [facility]: Dose Right    [facility]: Teradose 4D    [facility]: Smart Biotix



RADIATION DOSE:  CT Rad equipment meets quality standard of care and radiation dose reduction techniq
ues were employed. CTDIvol: 53.2 mGy. DLP: 1097 mGy-cm. mGy.



LIMITATIONS:  None.



FINDINGS:  VENTRICLES: Normal size and contour.

CEREBRUM: No masses.  No hemorrhage.  No midline shift.  No evidence for acute infarction. Normal gra
y/white matter differentiation. No areas of low density in the white matter.

CEREBELLUM: No masses.  No hemorrhage.  No alteration of density.  No evidence for acute infarction.

EXTRAAXIAL SPACES: No fluid collections.  No masses.

ORBITS AND GLOBE: No intra- or extraconal masses.  Normal contour of globe without masses.

CALVARIUM: No fracture.

PARANASAL SINUSES: No fluid or mucosal thickening.

SOFT TISSUES: No mass or hematoma.

OTHER: No other significant finding.



IMPRESSION:  NORMAL BRAIN CT WITHOUT CONTRAST.

EVIDENCE OF ACUTE STROKE: NO.



COMMENT:  Quality ID # 436: Final reports with documentation of one or more dose reduction techniques
 (e.g., Automated exposure control, adjustment of the mA and/or kV according to patient size, use of 
iterative reconstruction technique)



TECHNICAL DOCUMENTATION:  JOB ID:  [id number]

 2011 [employer]- All Rights Reserved



Reading location - IP/workstation name: [email or web address]

## 2020-08-11 NOTE — EKG REPORT
SEVERITY:- ABNORMAL ECG -

SINUS RHYTHM

FIRST DEGREE AV BLOCK

:

Confirmed by: Thomas Folres MD 11-Aug-2020 19:38:04

## 2020-08-11 NOTE — RADIOLOGY REPORT (SQ)
EXAM DESCRIPTION:  CHEST 2 VIEWS



IMAGES COMPLETED DATE/TIME:  8/11/2020 11:14 am



REASON FOR STUDY:  Unwitnessed syncopal event last night



COMPARISON:  6/4/2020.



EXAM PARAMETERS:  NUMBER OF VIEWS: two views

TECHNIQUE: Digital Frontal and Lateral radiographic views of the chest acquired.

RADIATION DOSE: NA

LIMITATIONS: none



FINDINGS:  LUNGS AND PLEURA: No opacities, masses or pneumothorax. No pleural effusion.

MEDIASTINUM AND HILAR STRUCTURES: No masses or contour abnormalities.

HEART AND VASCULAR STRUCTURES: Heart normal size.  No evidence for failure.

BONES: No acute findings.

HARDWARE: None in the chest.

OTHER: No other significant finding.



IMPRESSION:  NO ACUTE RADIOGRAPHIC FINDING IN THE CHEST.



TECHNICAL DOCUMENTATION:  JOB ID:  [id number]

 2011 [employer]- All Rights Reserved



Reading location - IP/workstation name: [email or web address]